# Patient Record
Sex: MALE | Race: OTHER | HISPANIC OR LATINO | URBAN - METROPOLITAN AREA
[De-identification: names, ages, dates, MRNs, and addresses within clinical notes are randomized per-mention and may not be internally consistent; named-entity substitution may affect disease eponyms.]

---

## 2023-12-27 ENCOUNTER — EMERGENCY (EMERGENCY)
Facility: HOSPITAL | Age: 53
LOS: 1 days | Discharge: HOME | End: 2023-12-27
Attending: EMERGENCY MEDICINE
Payer: MEDICAID

## 2023-12-27 VITALS
TEMPERATURE: 98 F | DIASTOLIC BLOOD PRESSURE: 79 MMHG | RESPIRATION RATE: 16 BRPM | HEART RATE: 68 BPM | OXYGEN SATURATION: 97 % | SYSTOLIC BLOOD PRESSURE: 144 MMHG

## 2023-12-27 VITALS
SYSTOLIC BLOOD PRESSURE: 175 MMHG | DIASTOLIC BLOOD PRESSURE: 89 MMHG | RESPIRATION RATE: 16 BRPM | OXYGEN SATURATION: 96 % | HEIGHT: 68 IN | WEIGHT: 179.9 LBS | HEART RATE: 70 BPM | TEMPERATURE: 98 F

## 2023-12-27 LAB
ANION GAP SERPL CALC-SCNC: 5 MMOL/L — SIGNIFICANT CHANGE UP (ref 5–17)
ANION GAP SERPL CALC-SCNC: 5 MMOL/L — SIGNIFICANT CHANGE UP (ref 5–17)
BASOPHILS # BLD AUTO: 0.09 K/UL — SIGNIFICANT CHANGE UP (ref 0–0.2)
BASOPHILS # BLD AUTO: 0.09 K/UL — SIGNIFICANT CHANGE UP (ref 0–0.2)
BASOPHILS NFR BLD AUTO: 1 % — SIGNIFICANT CHANGE UP (ref 0–2)
BASOPHILS NFR BLD AUTO: 1 % — SIGNIFICANT CHANGE UP (ref 0–2)
BUN SERPL-MCNC: 17 MG/DL — SIGNIFICANT CHANGE UP (ref 7–18)
BUN SERPL-MCNC: 17 MG/DL — SIGNIFICANT CHANGE UP (ref 7–18)
CALCIUM SERPL-MCNC: 8.7 MG/DL — SIGNIFICANT CHANGE UP (ref 8.4–10.5)
CALCIUM SERPL-MCNC: 8.7 MG/DL — SIGNIFICANT CHANGE UP (ref 8.4–10.5)
CHLORIDE SERPL-SCNC: 107 MMOL/L — SIGNIFICANT CHANGE UP (ref 96–108)
CHLORIDE SERPL-SCNC: 107 MMOL/L — SIGNIFICANT CHANGE UP (ref 96–108)
CO2 SERPL-SCNC: 27 MMOL/L — SIGNIFICANT CHANGE UP (ref 22–31)
CO2 SERPL-SCNC: 27 MMOL/L — SIGNIFICANT CHANGE UP (ref 22–31)
CREAT SERPL-MCNC: 0.85 MG/DL — SIGNIFICANT CHANGE UP (ref 0.5–1.3)
CREAT SERPL-MCNC: 0.85 MG/DL — SIGNIFICANT CHANGE UP (ref 0.5–1.3)
EGFR: 105 ML/MIN/1.73M2 — SIGNIFICANT CHANGE UP
EGFR: 105 ML/MIN/1.73M2 — SIGNIFICANT CHANGE UP
EOSINOPHIL # BLD AUTO: 0.27 K/UL — SIGNIFICANT CHANGE UP (ref 0–0.5)
EOSINOPHIL # BLD AUTO: 0.27 K/UL — SIGNIFICANT CHANGE UP (ref 0–0.5)
EOSINOPHIL NFR BLD AUTO: 2.9 % — SIGNIFICANT CHANGE UP (ref 0–6)
EOSINOPHIL NFR BLD AUTO: 2.9 % — SIGNIFICANT CHANGE UP (ref 0–6)
GLUCOSE SERPL-MCNC: 99 MG/DL — SIGNIFICANT CHANGE UP (ref 70–99)
GLUCOSE SERPL-MCNC: 99 MG/DL — SIGNIFICANT CHANGE UP (ref 70–99)
HCT VFR BLD CALC: 41 % — SIGNIFICANT CHANGE UP (ref 39–50)
HCT VFR BLD CALC: 41 % — SIGNIFICANT CHANGE UP (ref 39–50)
HGB BLD-MCNC: 14.1 G/DL — SIGNIFICANT CHANGE UP (ref 13–17)
HGB BLD-MCNC: 14.1 G/DL — SIGNIFICANT CHANGE UP (ref 13–17)
IMM GRANULOCYTES NFR BLD AUTO: 1.7 % — HIGH (ref 0–0.9)
IMM GRANULOCYTES NFR BLD AUTO: 1.7 % — HIGH (ref 0–0.9)
LYMPHOCYTES # BLD AUTO: 3.69 K/UL — HIGH (ref 1–3.3)
LYMPHOCYTES # BLD AUTO: 3.69 K/UL — HIGH (ref 1–3.3)
LYMPHOCYTES # BLD AUTO: 39.8 % — SIGNIFICANT CHANGE UP (ref 13–44)
LYMPHOCYTES # BLD AUTO: 39.8 % — SIGNIFICANT CHANGE UP (ref 13–44)
MCHC RBC-ENTMCNC: 30.2 PG — SIGNIFICANT CHANGE UP (ref 27–34)
MCHC RBC-ENTMCNC: 30.2 PG — SIGNIFICANT CHANGE UP (ref 27–34)
MCHC RBC-ENTMCNC: 34.4 GM/DL — SIGNIFICANT CHANGE UP (ref 32–36)
MCHC RBC-ENTMCNC: 34.4 GM/DL — SIGNIFICANT CHANGE UP (ref 32–36)
MCV RBC AUTO: 87.8 FL — SIGNIFICANT CHANGE UP (ref 80–100)
MCV RBC AUTO: 87.8 FL — SIGNIFICANT CHANGE UP (ref 80–100)
MONOCYTES # BLD AUTO: 0.58 K/UL — SIGNIFICANT CHANGE UP (ref 0–0.9)
MONOCYTES # BLD AUTO: 0.58 K/UL — SIGNIFICANT CHANGE UP (ref 0–0.9)
MONOCYTES NFR BLD AUTO: 6.3 % — SIGNIFICANT CHANGE UP (ref 2–14)
MONOCYTES NFR BLD AUTO: 6.3 % — SIGNIFICANT CHANGE UP (ref 2–14)
NEUTROPHILS # BLD AUTO: 4.47 K/UL — SIGNIFICANT CHANGE UP (ref 1.8–7.4)
NEUTROPHILS # BLD AUTO: 4.47 K/UL — SIGNIFICANT CHANGE UP (ref 1.8–7.4)
NEUTROPHILS NFR BLD AUTO: 48.3 % — SIGNIFICANT CHANGE UP (ref 43–77)
NEUTROPHILS NFR BLD AUTO: 48.3 % — SIGNIFICANT CHANGE UP (ref 43–77)
NRBC # BLD: 0 /100 WBCS — SIGNIFICANT CHANGE UP (ref 0–0)
NRBC # BLD: 0 /100 WBCS — SIGNIFICANT CHANGE UP (ref 0–0)
PLATELET # BLD AUTO: 191 K/UL — SIGNIFICANT CHANGE UP (ref 150–400)
PLATELET # BLD AUTO: 191 K/UL — SIGNIFICANT CHANGE UP (ref 150–400)
POTASSIUM SERPL-MCNC: 3.2 MMOL/L — LOW (ref 3.5–5.3)
POTASSIUM SERPL-MCNC: 3.2 MMOL/L — LOW (ref 3.5–5.3)
POTASSIUM SERPL-SCNC: 3.2 MMOL/L — LOW (ref 3.5–5.3)
POTASSIUM SERPL-SCNC: 3.2 MMOL/L — LOW (ref 3.5–5.3)
RBC # BLD: 4.67 M/UL — SIGNIFICANT CHANGE UP (ref 4.2–5.8)
RBC # BLD: 4.67 M/UL — SIGNIFICANT CHANGE UP (ref 4.2–5.8)
RBC # FLD: 14.3 % — SIGNIFICANT CHANGE UP (ref 10.3–14.5)
RBC # FLD: 14.3 % — SIGNIFICANT CHANGE UP (ref 10.3–14.5)
SODIUM SERPL-SCNC: 139 MMOL/L — SIGNIFICANT CHANGE UP (ref 135–145)
SODIUM SERPL-SCNC: 139 MMOL/L — SIGNIFICANT CHANGE UP (ref 135–145)
WBC # BLD: 9.26 K/UL — SIGNIFICANT CHANGE UP (ref 3.8–10.5)
WBC # BLD: 9.26 K/UL — SIGNIFICANT CHANGE UP (ref 3.8–10.5)
WBC # FLD AUTO: 9.26 K/UL — SIGNIFICANT CHANGE UP (ref 3.8–10.5)
WBC # FLD AUTO: 9.26 K/UL — SIGNIFICANT CHANGE UP (ref 3.8–10.5)

## 2023-12-27 PROCEDURE — 99284 EMERGENCY DEPT VISIT MOD MDM: CPT

## 2023-12-27 PROCEDURE — 80048 BASIC METABOLIC PNL TOTAL CA: CPT

## 2023-12-27 PROCEDURE — 85025 COMPLETE CBC W/AUTO DIFF WBC: CPT

## 2023-12-27 PROCEDURE — 73630 X-RAY EXAM OF FOOT: CPT | Mod: 26,LT

## 2023-12-27 PROCEDURE — 73630 X-RAY EXAM OF FOOT: CPT

## 2023-12-27 PROCEDURE — 99283 EMERGENCY DEPT VISIT LOW MDM: CPT | Mod: 25

## 2023-12-27 PROCEDURE — 36415 COLL VENOUS BLD VENIPUNCTURE: CPT

## 2023-12-27 RX ORDER — ACETAMINOPHEN 500 MG
650 TABLET ORAL ONCE
Refills: 0 | Status: COMPLETED | OUTPATIENT
Start: 2023-12-27 | End: 2023-12-27

## 2023-12-27 RX ADMIN — Medication 500 MILLIGRAM(S): at 18:48

## 2023-12-27 RX ADMIN — Medication 650 MILLIGRAM(S): at 18:21

## 2023-12-27 RX ADMIN — Medication 650 MILLIGRAM(S): at 17:51

## 2023-12-27 RX ADMIN — Medication 500 MILLIGRAM(S): at 18:18

## 2023-12-27 NOTE — ED PROVIDER NOTE - NSFOLLOWUPCLINICSTOKEN_GEN_ALL_ED_FT
[id number]: || ||00\01||False; [id number]: || ||00\01||False; [id number]: || ||00\01||False; [id number]: || ||00\01||False;

## 2023-12-27 NOTE — ED PROVIDER NOTE - OBJECTIVE STATEMENT
53-year-old male, history of hypertension, HLD, gout, left foot surgery (for gout) lower year ago, presents for evaluation of intermittent left foot pain.  Pain usually starts if walking a lot or exerting himself.  Pain does not happen every day.  Denies any similarity to last gout attack.  At times also feels transient tingling sensation to the calf which last few seconds and goes away.  Was recently evaluated and had an x-ray 6 months ago.  Did not follow-up with specialist.  Was told to take medication over-the-counter.  Denies any recent skin breakdown or trauma.  Denies any ready numbness, swelling, streaking or any other complaint. Patient also reports that recently even though he drinks a lot of water he urinates much less.

## 2023-12-27 NOTE — ED PROVIDER NOTE - NS_EDPROVIDERDISPOUSERTYPE_ED_A_ED
peep to 10 per dr myrick order

-------------------------------------------------------------------------------

Addendum: 04/22/20 at 0509 by DORY RUSSELL RT

-------------------------------------------------------------------------------

Amended: Links added. Attending Attestation (For Attendings USE Only)...

## 2023-12-27 NOTE — ED PROVIDER NOTE - WR INTERPRETATION 1
Independent interpretation of x-ray images by Jaziel George NP: Degenerative changes of the ankle.  No fracture or dislocation.

## 2023-12-27 NOTE — ED PROVIDER NOTE - NSFOLLOWUPCLINICS_GEN_ALL_ED_FT
[facility]  Podiatry/Wound Care  [address] [place] [address]  Phone: [phone]  Fax: [phone]     [facility]  Podiatry/Wound Care  [address] [place] [address]  Phone: [phone]  Fax: [phone]     [facility]  Podiatry/Wound Care  [address] [place] [address]  Phone: [phone]  Fax: [phone]     [facility]  Podiatry/Wound Care  [address] [place] [address]  Phone: [phone]  Fax: [phone]

## 2023-12-27 NOTE — ED ADULT NURSE NOTE - OBJECTIVE STATEMENT
As per pt. c/o left  ankle pain x1 year s/p ankle surgery. Pt. also c/o of severe pain especially when  he walks

## 2023-12-27 NOTE — ED PROVIDER NOTE - PATIENT PORTAL LINK FT
You can access the FollowMyHealth Patient Portal offered by [facility] by registering at the following website: [email or web address]. By joining [employer]’s FollowMyHealth portal, you will also be able to view your health information using other applications (apps) compatible with our system. You can access the FollowMyHealth Patient Portal offered by [facility] by registering at the following website: [email or web address]. By joining [employer]’s FollowMyHealth portal, you will also be able to view your health information using other applications (apps) compatible with our system. You can access the FollowMyHealth Patient Portal offered by [facility] by registering at the following website: [email or web address]. By joining [employer]’s FollowMyHealth portal, you will also be able to view your health information using other applications (apps) compatible with our system. You can access the FollowMyHealth Patient Portal offered by [facility] by registering at the following website: [email or web address]. By joining [employer]’s FollowMyHealth portal, you will also be able to view your health information using other applications (apps) compatible with our system.

## 2023-12-27 NOTE — ED PROVIDER NOTE - CLINICAL SUMMARY MEDICAL DECISION MAKING FREE TEXT BOX
53-year-old male presents with intermittent left foot pain x 1 year.  Also reports decreased urination while drinking plenty of fluids.  On exam well-appearing, ambulatory, vital signs stable, afebrile.  No signs of fluid overload.  No signs of cellulitis or abscess.  Low suspicion for acute gouty arthritis.  Low suspicion for septic arthritis.  Will do x-ray to rule out acute bony pathology and do labs to assess for creatinine/BUN/GFR.  If no acute concerning results today patient will need to follow-up outpatient with podiatry. negative Patient-parent interaction appropriate 53-year-old male presents with intermittent left foot pain x 1 year.  Also reports decreased urination while drinking plenty of fluids.  On exam well-appearing, ambulatory, vital signs stable, afebrile.  No signs of fluid overload.  No signs of cellulitis or abscess.  Low suspicion for acute gouty arthritis.  Low suspicion for septic arthritis.  Will do x-ray to rule out acute bony pathology and do labs to assess for creatinine/BUN/GFR.  If no acute concerning results today patient will need to follow-up outpatient with podiatry.    XR negative. Creat/BUN/gfr wnl. Will dc with podiatry clinic follow up within 1 week.

## 2023-12-27 NOTE — ED ADULT NURSE NOTE - CADM POA CENTRAL LINE
Problem: Falls - Risk of:  Goal: Will remain free from falls  Description: Will remain free from falls  1/14/2021 2329 by Harshal Alcantara RN  Outcome: Ongoing  1/14/2021 1119 by Audrey Garcia RN  Outcome: Ongoing  Goal: Absence of physical injury  Description: Absence of physical injury  1/14/2021 2329 by Harshal Alcantara RN  Outcome: Ongoing  1/14/2021 1119 by Audrey Garcia RN  Outcome: Ongoing No

## 2023-12-27 NOTE — ED PROVIDER NOTE - NSFOLLOWUPINSTRUCTIONS_ED_ALL_ED_FT
Follow up with the podiatry clinic within 1 week. Call to make appointment.  If you experience any new or worsening symptoms or if you are concerned you can always come back to the emergency for a re-evaluation.  Some results may not be available at the time of your discharge from the hospital. You can download the FOLLOW MY HEALTH jose and have access to these results.  **Official radiology report by the radiologist will be available within 24 hours. If there is a discrepancy you will contacted.   If there were any prescriptions given to you during the visit today take them as prescribed. If you have any questions you can ask the pharmacist.

## 2023-12-27 NOTE — ED ADULT NURSE NOTE - NSFALLUNIVINTERV_ED_ALL_ED
Bed/Stretcher in lowest position, wheels locked, appropriate side rails in place/Call bell, personal items and telephone in reach/Instruct patient to call for assistance before getting out of bed/chair/stretcher/Non-slip footwear applied when patient is off stretcher/[place] to call system/Physically safe environment - no spills, clutter or unnecessary equipment/Purposeful proactive rounding/Room/bathroom lighting operational, light cord in reach Bed/Stretcher in lowest position, wheels locked, appropriate side rails in place/Call bell, personal items and telephone in reach/Instruct patient to call for assistance before getting out of bed/chair/stretcher/Non-slip footwear applied when patient is off stretcher/[place] to call system/Physically safe environment - no spills, clutter or unnecessary equipment/Purposeful proactive rounding/Room/bathroom lighting operational, light cord in reach

## 2023-12-27 NOTE — ED PROVIDER NOTE - PHYSICAL EXAMINATION
Bilateral calf compartments soft and nontender.  Left ankle full range of motion.  Scar and mild swelling near the region of the ATFL from previous surgery.  Bony prominence to the lateral foot area near heel.  No erythema, induration or streaking.  Cap refill less than 2 seconds.  DP/PT pulses intact 2+.  No skin rash or breakdown.

## 2023-12-28 ENCOUNTER — APPOINTMENT (OUTPATIENT)
Dept: PODIATRY | Facility: CLINIC | Age: 53
End: 2023-12-28

## 2023-12-28 ENCOUNTER — OUTPATIENT (OUTPATIENT)
Dept: OUTPATIENT SERVICES | Facility: HOSPITAL | Age: 53
LOS: 1 days | End: 2023-12-28
Payer: SELF-PAY

## 2023-12-28 VITALS
DIASTOLIC BLOOD PRESSURE: 80 MMHG | TEMPERATURE: 98.5 F | WEIGHT: 165 LBS | HEART RATE: 77 BPM | SYSTOLIC BLOOD PRESSURE: 155 MMHG | HEIGHT: 67 IN | RESPIRATION RATE: 18 BRPM | BODY MASS INDEX: 25.9 KG/M2 | OXYGEN SATURATION: 96 %

## 2023-12-28 DIAGNOSIS — I10 ESSENTIAL (PRIMARY) HYPERTENSION: ICD-10-CM

## 2023-12-28 DIAGNOSIS — Z00.00 ENCOUNTER FOR GENERAL ADULT MEDICAL EXAMINATION WITHOUT ABNORMAL FINDINGS: ICD-10-CM

## 2023-12-28 DIAGNOSIS — E78.5 HYPERLIPIDEMIA, UNSPECIFIED: ICD-10-CM

## 2023-12-28 DIAGNOSIS — M79.672 PAIN IN LEFT FOOT: ICD-10-CM

## 2023-12-28 PROCEDURE — G0463: CPT

## 2023-12-29 DIAGNOSIS — M10.9 GOUT, UNSPECIFIED: ICD-10-CM

## 2023-12-29 PROBLEM — I10 HYPERTENSION, UNSPECIFIED TYPE: Status: ACTIVE | Noted: 2023-12-29

## 2023-12-29 PROBLEM — M79.672 LEFT FOOT PAIN: Status: ACTIVE | Noted: 2023-12-29

## 2023-12-29 PROBLEM — E78.5 HYPERLIPIDEMIA: Status: ACTIVE | Noted: 2023-12-29

## 2023-12-30 PROBLEM — M10.9 GOUT, UNSPECIFIED: Chronic | Status: ACTIVE | Noted: 2023-12-27

## 2023-12-30 PROBLEM — E78.5 HYPERLIPIDEMIA, UNSPECIFIED: Chronic | Status: ACTIVE | Noted: 2023-12-27

## 2023-12-30 PROBLEM — I10 ESSENTIAL (PRIMARY) HYPERTENSION: Chronic | Status: ACTIVE | Noted: 2023-12-27

## 2024-01-04 NOTE — PROCEDURE
[FreeTextEntry1] : Physcial exam:  Vasc: DP and PT pulses palpeable 2/4. CFt<3 seconds. TG warm to warm with no increase in temp to left ankle joint.  Derm: No open lesions. Edema noted to left medial mall. No erythema or ecchymosis appreciated.  Neuro: Gross sensation intact Msk: POP to left medial mall. Pain in inversion and eversion, but still 5/5 grade muscle strength  A:  Gout  P:  Patient and patients chart were reveiwed Diagnosis was discussed with patient Xrays reveiwed Educated patient to take naproxen Patient to be weight bearing as tolerated, but rest and elevate when able Patient to get possible MRI after finishing course of naproxen  Uric acid blood work ordered RTC in 2 weeks

## 2024-01-04 NOTE — HISTORY OF PRESENT ILLNESS
[FreeTextEntry1] : Patient stated that he went to the  ED where they took xrays of his foot and was given naproxen. Patient stated that he had surgery on his left foot for a cleaning of the joint. He was taking colchicine prior to his surgery but is no longer on it. Patient stated that his is not having pain at his ankle joint. Patient has not been doing anything at home for this issue. Denies other pedal complaints.

## 2024-01-11 ENCOUNTER — OUTPATIENT (OUTPATIENT)
Dept: OUTPATIENT SERVICES | Facility: HOSPITAL | Age: 54
LOS: 1 days | End: 2024-01-11
Payer: SELF-PAY

## 2024-01-11 ENCOUNTER — APPOINTMENT (OUTPATIENT)
Dept: RADIOLOGY | Facility: CLINIC | Age: 54
End: 2024-01-11
Payer: COMMERCIAL

## 2024-01-11 ENCOUNTER — APPOINTMENT (OUTPATIENT)
Dept: PODIATRY | Facility: CLINIC | Age: 54
End: 2024-01-11

## 2024-01-11 VITALS
SYSTOLIC BLOOD PRESSURE: 172 MMHG | DIASTOLIC BLOOD PRESSURE: 108 MMHG | HEIGHT: 67 IN | OXYGEN SATURATION: 96 % | BODY MASS INDEX: 25.9 KG/M2 | TEMPERATURE: 97 F | RESPIRATION RATE: 18 BRPM | HEART RATE: 73 BPM | WEIGHT: 165 LBS

## 2024-01-11 DIAGNOSIS — M10.9 GOUT, UNSPECIFIED: ICD-10-CM

## 2024-01-11 DIAGNOSIS — Z00.00 ENCOUNTER FOR GENERAL ADULT MEDICAL EXAMINATION WITHOUT ABNORMAL FINDINGS: ICD-10-CM

## 2024-01-11 DIAGNOSIS — M25.572 PAIN IN LEFT ANKLE AND JOINTS OF LEFT FOOT: ICD-10-CM

## 2024-01-11 PROCEDURE — 73610 X-RAY EXAM OF ANKLE: CPT | Mod: LT

## 2024-01-11 PROCEDURE — G0463: CPT

## 2024-01-18 NOTE — HISTORY OF PRESENT ILLNESS
[FreeTextEntry1] : 52 yo male returns to clinic for left ankle pain. Pt took naprosyn but still admits to pain. Per note, pt states that he had surgery done on his left ankle back in 2022 by ortho to clean up the joint for sepsis/gout. Since then, pt had chronic pain to his ankle. Previous foot xray was unremarkable. Pt states that the pain gets worse upon ambulation. Denies any other complaints. Denies any constitutional symptoms.

## 2024-01-18 NOTE — PROCEDURE
[FreeTextEntry1] : Physcial exam:  Vasc: DP and PT pulses palpeable 2/4. CFt<3 seconds. TG warm to warm with no increase in temp to left ankle joint.  Derm: No open lesions. Edema noted to left medial mall. No erythema or ecchymosis appreciated.  Neuro: Gross sensation intact Msk: POP around the ankle joint. Pain in inversion and eversion, but still 5/5 grade muscle strength  A:  Gout Left ankle pain   P:  Patient seen and evaluated Foot xray reviewed, ordered left ankle xray Uric acid noted to be elevated Rx colchicine 0.6mg x 7 days Educated patient to avoid food that can trigger gout attacks such as red meat, wine, seafood Patient to be weight bearing as tolerated, but rest and elevate as needed Will consider MRI after xray of the ankle if symptoms persist RTC in 2 weeks

## 2024-01-25 ENCOUNTER — APPOINTMENT (OUTPATIENT)
Dept: PODIATRY | Facility: CLINIC | Age: 54
End: 2024-01-25

## 2024-01-25 ENCOUNTER — OUTPATIENT (OUTPATIENT)
Dept: OUTPATIENT SERVICES | Facility: HOSPITAL | Age: 54
LOS: 1 days | End: 2024-01-25
Payer: SELF-PAY

## 2024-01-25 VITALS
OXYGEN SATURATION: 98 % | RESPIRATION RATE: 18 BRPM | DIASTOLIC BLOOD PRESSURE: 76 MMHG | HEART RATE: 96 BPM | TEMPERATURE: 98.2 F | WEIGHT: 167 LBS | SYSTOLIC BLOOD PRESSURE: 119 MMHG | BODY MASS INDEX: 26.21 KG/M2 | HEIGHT: 67 IN

## 2024-01-25 DIAGNOSIS — Z00.00 ENCOUNTER FOR GENERAL ADULT MEDICAL EXAMINATION WITHOUT ABNORMAL FINDINGS: ICD-10-CM

## 2024-01-25 PROCEDURE — G0463: CPT

## 2024-01-25 PROCEDURE — 20605 DRAIN/INJ JOINT/BURSA W/O US: CPT

## 2024-01-26 DIAGNOSIS — M10.9 GOUT, UNSPECIFIED: ICD-10-CM

## 2024-01-26 DIAGNOSIS — M25.572 PAIN IN LEFT ANKLE AND JOINTS OF LEFT FOOT: ICD-10-CM

## 2024-02-08 ENCOUNTER — OUTPATIENT (OUTPATIENT)
Dept: OUTPATIENT SERVICES | Facility: HOSPITAL | Age: 54
LOS: 1 days | End: 2024-02-08
Payer: SELF-PAY

## 2024-02-08 ENCOUNTER — APPOINTMENT (OUTPATIENT)
Dept: PODIATRY | Facility: CLINIC | Age: 54
End: 2024-02-08

## 2024-02-08 VITALS
DIASTOLIC BLOOD PRESSURE: 93 MMHG | OXYGEN SATURATION: 96 % | HEIGHT: 67 IN | RESPIRATION RATE: 18 BRPM | BODY MASS INDEX: 26.21 KG/M2 | SYSTOLIC BLOOD PRESSURE: 158 MMHG | HEART RATE: 71 BPM | TEMPERATURE: 97.7 F | WEIGHT: 167 LBS

## 2024-02-08 DIAGNOSIS — M10.072 IDIOPATHIC GOUT, LEFT ANKLE AND FOOT: ICD-10-CM

## 2024-02-08 DIAGNOSIS — M25.572 PAIN IN LEFT ANKLE AND JOINTS OF LEFT FOOT: ICD-10-CM

## 2024-02-08 DIAGNOSIS — M19.072 PRIMARY OSTEOARTHRITIS, LEFT ANKLE AND FOOT: ICD-10-CM

## 2024-02-08 DIAGNOSIS — Z00.00 ENCOUNTER FOR GENERAL ADULT MEDICAL EXAMINATION WITHOUT ABNORMAL FINDINGS: ICD-10-CM

## 2024-02-08 PROCEDURE — G0463: CPT

## 2024-02-08 NOTE — HISTORY OF PRESENT ILLNESS
[FreeTextEntry1] : 52 yo male returns to clinic for left ankle pain. Pt took colcichine for 2 weeks as uric acid levels noted.   Per note, pt states that he had surgery done on his left ankle back in 2022 by ortho to clean up the joint for sepsis/gout. Since then, pt had chronic pain to his ankle. Previous foot xray was unremarkable. 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 Pt states that the pain gets worse upon ambulation. Denies any other complaints. Denies any constitutional symptoms.

## 2024-02-08 NOTE — PROCEDURE
[FreeTextEntry1] : Physcial exam:  Vasc: DP and PT pulses palpeable 2/4. CFt<3 seconds. TG warm to warm with no increase in temp to left ankle joint.  Derm: No open lesions. Edema noted to left medial mall. No erythema or ecchymosis appreciated.  Neuro: Gross sensation intact Msk: POP around the ankle joint at the medial tibio talar articulation. Pain in inversion and eversion, but still 5/5 grade muscle strength  A:  Gout Left ankle pain   P:  Patient seen and evaluated Foot xray reviewed,  Reviewed left ankle xray Uric acid noted to be elevated Finished colchicine 0.6mg x 7 days ankle joint injection last week, provided minimal release.  MRI left ankle ordered Recommend using ankle brace for ankle stability.  Recommend rheumatology  Educated patient to avoid food that can trigger gout attacks such as red meat, wine, seafood Patient to be weight bearing as tolerated, but rest and elevate as needed RTC after MRI L ankle and rheum consult

## 2024-02-08 NOTE — HISTORY OF PRESENT ILLNESS
[FreeTextEntry1] : 54 yo male returns to clinic for left ankle pain. Pt took colcichine for 2 weeks as uric acid levels noted.   Per note, pt states that he had surgery done on his left ankle back in 2022 by ortho to clean up the joint for sepsis/gout. Since then, pt had chronic pain to his ankle. Previous foot xray was unremarkable. 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 Pt states that the pain gets worse upon ambulation. Denies any other complaints. Denies any constitutional symptoms.   Pt presents today states his pain is still there and the injection  worked for several days. Patient states he still has pain on his ankle when he does long distance walks and nothing works. Patient states that NSAIDs also do not work. Patient denies any other pedal complaints at this time.  OF note: Patient brought paperwork that he had a I&D washout in 2022 with ortho. Cultures show it was crystalized gout.l

## 2024-02-08 NOTE — PROCEDURE
[FreeTextEntry1] : Physcial exam:  Vasc: DP and PT pulses palpeable 2/4. CFt<3 seconds. TG warm to warm with no increase in temp to left ankle joint.  Derm: No open lesions. Edema noted to left medial mall. No erythema or ecchymosis appreciated.  Neuro: Gross sensation intact Msk: POP around the ankle joint at the medial tibio talar articulation. Pain in inversion and eversion, but still 5/5 grade muscle strength  A:  Gout Left ankle pain   P:  Patient seen and evaluated Foot xray reviewed,  Reviewed left ankle xray Uric acid noted to be elevated Finished colchicine 0.6mg x 7 days obtained written consent and injected 2 cc mix of dexamethason and lidocaine to left medial ankle joint without incidence Educated patient to avoid food that can trigger gout attacks such as red meat, wine, seafood Patient to be weight bearing as tolerated, but rest and elevate as needed RTC in 2 weeks

## 2024-02-23 ENCOUNTER — APPOINTMENT (OUTPATIENT)
Dept: MRI IMAGING | Facility: CLINIC | Age: 54
End: 2024-02-23
Payer: COMMERCIAL

## 2024-02-23 PROCEDURE — 73721 MRI JNT OF LWR EXTRE W/O DYE: CPT | Mod: LT

## 2024-04-12 ENCOUNTER — OUTPATIENT (OUTPATIENT)
Dept: OUTPATIENT SERVICES | Facility: HOSPITAL | Age: 54
LOS: 1 days | End: 2024-04-12
Payer: SELF-PAY

## 2024-04-12 ENCOUNTER — APPOINTMENT (OUTPATIENT)
Dept: RHEUMATOLOGY | Facility: HOSPITAL | Age: 54
End: 2024-04-12
Payer: COMMERCIAL

## 2024-04-12 VITALS
OXYGEN SATURATION: 97 % | DIASTOLIC BLOOD PRESSURE: 117 MMHG | WEIGHT: 190 LBS | SYSTOLIC BLOOD PRESSURE: 197 MMHG | BODY MASS INDEX: 29.82 KG/M2 | RESPIRATION RATE: 16 BRPM | TEMPERATURE: 97.6 F | HEIGHT: 67 IN | HEART RATE: 70 BPM

## 2024-04-12 VITALS — SYSTOLIC BLOOD PRESSURE: 168 MMHG | DIASTOLIC BLOOD PRESSURE: 110 MMHG

## 2024-04-12 DIAGNOSIS — M25.572 PAIN IN LEFT ANKLE AND JOINTS OF LEFT FOOT: ICD-10-CM

## 2024-04-12 DIAGNOSIS — M06.9 RHEUMATOID ARTHRITIS, UNSPECIFIED: ICD-10-CM

## 2024-04-12 DIAGNOSIS — M10.9 GOUT, UNSPECIFIED: ICD-10-CM

## 2024-04-12 LAB
A1C WITH ESTIMATED AVERAGE GLUCOSE RESULT: 5.6 % — SIGNIFICANT CHANGE UP (ref 4–5.6)
ALBUMIN SERPL ELPH-MCNC: 4.7 G/DL — SIGNIFICANT CHANGE UP (ref 3.3–5)
ALP SERPL-CCNC: 122 U/L — HIGH (ref 40–120)
ALT FLD-CCNC: 15 U/L — SIGNIFICANT CHANGE UP (ref 10–45)
ANION GAP SERPL CALC-SCNC: 15 MMOL/L — SIGNIFICANT CHANGE UP (ref 5–17)
AST SERPL-CCNC: 13 U/L — SIGNIFICANT CHANGE UP (ref 10–40)
BASOPHILS # BLD AUTO: 0.06 K/UL — SIGNIFICANT CHANGE UP (ref 0–0.2)
BASOPHILS NFR BLD AUTO: 0.7 % — SIGNIFICANT CHANGE UP (ref 0–2)
BILIRUB SERPL-MCNC: 0.6 MG/DL — SIGNIFICANT CHANGE UP (ref 0.2–1.2)
BUN SERPL-MCNC: 17 MG/DL — SIGNIFICANT CHANGE UP (ref 7–23)
CALCIUM SERPL-MCNC: 9.6 MG/DL — SIGNIFICANT CHANGE UP (ref 8.4–10.5)
CHLORIDE SERPL-SCNC: 100 MMOL/L — SIGNIFICANT CHANGE UP (ref 96–108)
CHOLEST SERPL-MCNC: 227 MG/DL — HIGH
CO2 SERPL-SCNC: 24 MMOL/L — SIGNIFICANT CHANGE UP (ref 22–31)
CREAT SERPL-MCNC: 0.88 MG/DL — SIGNIFICANT CHANGE UP (ref 0.5–1.3)
EGFR: 103 ML/MIN/1.73M2 — SIGNIFICANT CHANGE UP
EOSINOPHIL # BLD AUTO: 0.3 K/UL — SIGNIFICANT CHANGE UP (ref 0–0.5)
EOSINOPHIL NFR BLD AUTO: 3.5 % — SIGNIFICANT CHANGE UP (ref 0–6)
ESTIMATED AVERAGE GLUCOSE: 114 MG/DL — SIGNIFICANT CHANGE UP (ref 68–114)
GLUCOSE SERPL-MCNC: 84 MG/DL — SIGNIFICANT CHANGE UP (ref 70–99)
HCT VFR BLD CALC: 43.9 % — SIGNIFICANT CHANGE UP (ref 39–50)
HDLC SERPL-MCNC: 31 MG/DL — LOW
HGB BLD-MCNC: 14.9 G/DL — SIGNIFICANT CHANGE UP (ref 13–17)
IMM GRANULOCYTES NFR BLD AUTO: 0.5 % — SIGNIFICANT CHANGE UP (ref 0–0.9)
LIPID PNL WITH DIRECT LDL SERPL: 89 MG/DL — SIGNIFICANT CHANGE UP
LYMPHOCYTES # BLD AUTO: 2.58 K/UL — SIGNIFICANT CHANGE UP (ref 1–3.3)
LYMPHOCYTES # BLD AUTO: 30 % — SIGNIFICANT CHANGE UP (ref 13–44)
MCHC RBC-ENTMCNC: 29.9 PG — SIGNIFICANT CHANGE UP (ref 27–34)
MCHC RBC-ENTMCNC: 33.9 GM/DL — SIGNIFICANT CHANGE UP (ref 32–36)
MCV RBC AUTO: 88 FL — SIGNIFICANT CHANGE UP (ref 80–100)
MONOCYTES # BLD AUTO: 0.57 K/UL — SIGNIFICANT CHANGE UP (ref 0–0.9)
MONOCYTES NFR BLD AUTO: 6.6 % — SIGNIFICANT CHANGE UP (ref 2–14)
NEUTROPHILS # BLD AUTO: 5.05 K/UL — SIGNIFICANT CHANGE UP (ref 1.8–7.4)
NEUTROPHILS NFR BLD AUTO: 58.7 % — SIGNIFICANT CHANGE UP (ref 43–77)
NON HDL CHOLESTEROL: 196 MG/DL — HIGH
PLATELET # BLD AUTO: 235 K/UL — SIGNIFICANT CHANGE UP (ref 150–400)
POTASSIUM SERPL-MCNC: 3.8 MMOL/L — SIGNIFICANT CHANGE UP (ref 3.5–5.3)
POTASSIUM SERPL-SCNC: 3.8 MMOL/L — SIGNIFICANT CHANGE UP (ref 3.5–5.3)
PROT SERPL-MCNC: 7.7 G/DL — SIGNIFICANT CHANGE UP (ref 6–8.3)
RBC # BLD: 4.99 M/UL — SIGNIFICANT CHANGE UP (ref 4.2–5.8)
RBC # FLD: 13.6 % — SIGNIFICANT CHANGE UP (ref 10.3–14.5)
SODIUM SERPL-SCNC: 139 MMOL/L — SIGNIFICANT CHANGE UP (ref 135–145)
TRIGL SERPL-MCNC: 650 MG/DL — HIGH
URATE SERPL-MCNC: 7.7 MG/DL — SIGNIFICANT CHANGE UP (ref 3.4–8.8)
WBC # BLD: 8.6 K/UL — SIGNIFICANT CHANGE UP (ref 3.8–10.5)
WBC # FLD AUTO: 8.6 K/UL — SIGNIFICANT CHANGE UP (ref 3.8–10.5)

## 2024-04-12 PROCEDURE — 80061 LIPID PANEL: CPT

## 2024-04-12 PROCEDURE — 80053 COMPREHEN METABOLIC PANEL: CPT

## 2024-04-12 PROCEDURE — 84550 ASSAY OF BLOOD/URIC ACID: CPT

## 2024-04-12 PROCEDURE — 85025 COMPLETE CBC W/AUTO DIFF WBC: CPT

## 2024-04-12 PROCEDURE — 83036 HEMOGLOBIN GLYCOSYLATED A1C: CPT

## 2024-04-12 PROCEDURE — ZZZZZ: CPT | Mod: GC

## 2024-04-12 PROCEDURE — G0463: CPT

## 2024-04-12 RX ORDER — NIFEDIPINE 90 MG/1
90 TABLET, EXTENDED RELEASE ORAL DAILY
Refills: 0 | Status: ACTIVE | COMMUNITY
Start: 2024-04-12

## 2024-04-12 RX ORDER — ACETAMINOPHEN 325 MG/1
TABLET, FILM COATED ORAL
Refills: 0 | Status: DISCONTINUED | COMMUNITY
End: 2024-04-12

## 2024-04-12 RX ORDER — COLCHICINE 0.6 MG/1
0.6 TABLET ORAL TWICE DAILY
Qty: 14 | Refills: 0 | Status: DISCONTINUED | COMMUNITY
Start: 2024-01-11 | End: 2024-04-12

## 2024-04-12 RX ORDER — IRBESARTAN 150 MG/1
150 TABLET ORAL
Qty: 120 | Refills: 0 | Status: ACTIVE | COMMUNITY
Start: 2024-04-12 | End: 1900-01-01

## 2024-04-12 NOTE — PHYSICAL EXAM
[Abnormal Walk] : normal gait [Nail Clubbing] : no clubbing  or cyanosis of the fingernails [General Appearance - Alert] : alert [General Appearance - In No Acute Distress] : in no acute distress [Sclera] : the sclera and conjunctiva were normal [Outer Ear] : the ears and nose were normal in appearance [Oropharynx] : the oropharynx was normal [Neck Appearance] : the appearance of the neck was normal [Respiration, Rhythm And Depth] : normal respiratory rhythm and effort [Auscultation Breath Sounds / Voice Sounds] : lungs were clear to auscultation bilaterally [Heart Sounds] : normal S1 and S2 [Murmurs] : no murmurs [Edema] : there was no peripheral edema [No CVA Tenderness] : no ~M costovertebral angle tenderness [No Spinal Tenderness] : no spinal tenderness [Skin Turgor] : normal skin turgor [] : no rash [No Focal Deficits] : no focal deficits [Oriented To Time, Place, And Person] : oriented to person, place, and time [Affect] : the affect was normal [FreeTextEntry1] : L ankle diffuse tenderness of posterior tibial tendon proximal and just distal to medial malleolus, no pain at insertion of posterial tibial tendon, positive tinel at percussion of tarsal tunnel, positive anterior drawer test, no swelling, warmth, or erythema. other joints no tenderness

## 2024-04-12 NOTE — ASSESSMENT
[FreeTextEntry1] : The patient is a 53ym with MHx HTN, HLD, and gout presenting for evaluation of L ankle pain and swelling.   #Chronic L ankle pain and swelling   - Follows with Podiatry  - Reports chronic pain and swelling worse with ambulation since Ortho washout 12/2022 - MRI 2/2024 with mild tibiotalar arthrosis, mucoid degeneration of the ATFL, PTFL, and deltoid ligaments, posterior tibialis tenosynovitis, longitudinal split tear of the peroneus brevis tendon as it courses inferior to the lateral malleolus with distal reconstitution, and moderate amount of loculated fluid tracking dorsally along the roots of the extensor retinaculum in the sinus Tarsi  - Given MRI and exam findings as above, suspect pain is due to tendon tearing and degenerative changes rather than gout  - Advised supportive measures  - Advised on proper shoe wear  - Advised continued f/u with Podiatry, referral given for [facility]   #Gout - Hx of gout crystals seen during washout 12/2022 - No flares within the last year  - Uric acid 9.2 12/2023  - No indication for ULT at this time, but will re-evaluate if pt flares  - Counseled about lifestyle modifications   #Metabolic syndrome with uncontrolled HTN, BMI 30 - BP elevated, pt asymptomatic - Pt counseled to continue monitoring his BP at home - Increase irbesartan to 300 mg daily, c/w nifedipine ER 90 mg daily - Will check CBC, CMP, A1c, and lipid profile - Referral given to switch his PCP to [address]   Discussed with Dr. Malachi MACKEY 2 months for re-evaluation after above interventions   Sowmya Marin MD Rheumatology Fellow

## 2024-04-12 NOTE — REASON FOR VISIT
[Consultation] : a consultation visit [Spouse] : spouse [Interpreters_IDNumber] : [id number] [Interpreters_FullName] : Obed [TWNoteComboBox1] : [place]

## 2024-04-12 NOTE — HISTORY OF PRESENT ILLNESS
[Currently Experiencing] : currently [Arthralgias] : arthralgias [FreeTextEntry1] : The patient is a 53ym with MHx HTN, HLD, and gout presenting for evaluation of L ankle pain and swelling.   Reports a history of gout diagnosed 5 years ago based on symptoms. Only followed with his PCP, never followed with a rheumatologist. Reports having gout flares primarily of his L ankle around the lateral malleolus about 1-2 times per year. Denies any other joint involvement. Was hospitalized 12/2022 for L ankle pain and swelling and had arthrocentesis done with WBC >50k no crystals noted. There was concern for septic joint so Ortho did a washout with crystals noted during it with negative cultures. Pt was on colchicine briefly that time. Denies ever being on ULT in the past. No alcohol, red meat, or shellfish exposure. No hx of CKD or kidney stones. Complains of chronic L ankle pain and swelling around the medial malleolus, worse after ambulation, after the washout procedure 12/2022. Denies any further gout episodes with redness and warmth since that procedure. Has been following with Podiatry for the L ankle issue, s/p steroid injection recently with minimal relief.   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, no alcohol  [Anorexia] : no anorexia [Weight Loss] : no weight loss [Malaise] : no malaise [Fever] : no fever [Chills] : no chills [Fatigue] : no fatigue [Depression] : no depression [Malar Facial Rash] : no malar facial rash [Skin Lesions] : no lesions [Skin Nodules] : no skin nodules [Oral Ulcers] : no oral ulcers [Cough] : no cough [Dry Mouth] : no dry mouth [Dysphonia] : no dysphonia [Dysphagia] : no dysphagia [Shortness of Breath] : no shortness of breath [Chest Pain] : no chest pain [Joint Swelling] : no joint swelling [Joint Warmth] : no joint warmth [Joint Deformity] : no joint deformity [Decreased ROM] : no decreased range of motion [Morning Stiffness] : no morning stiffness [Falls] : no falls [Difficulty Standing] : no difficulty standing [Difficulty Walking] : no difficulty walking [Dyspnea] : no dyspnea [Myalgias] : no myalgias [Muscle Weakness] : no muscle weakness [Muscle Spasms] : no muscle spasms [Muscle Cramping] : no muscle cramping [Visual Changes] : no visual changes [Eye Pain] : no eye pain [Eye Redness] : no eye redness [Dry Eyes] : no dry eyes

## 2024-04-12 NOTE — DATA REVIEWED
[FreeTextEntry1] : X-ray L ankle 1/2024  EXAM: [id number] - XR ANKLE COMP MIN 3 VIEWS LT  - ORDERED BY: KT DE LEON PROCEDURE DATE:  01/11/2024 INTERPRETATION:  3 views of the left ankle. Clinical indications: Left ankle pain. 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 Osseous productive change at the medial malleoli likely related to chronic trauma. No acute fracture.  MRI L ankle 2/2024  IMPRESSION: 1. Mild tibiotalar arthrosis  2. Mucoid degeneration of the ATFL, PTFL, and deltoid ligaments. 3. Posterior tibialis tenosynovitis. 4. Longitudinal split tear of the peroneus brevis tendon as it courses inferior to the lateral malleolus with distal reconstitution. 5. Moderate amount of loculated fluid tracking dorsally along the roots of the extensor retinaculum in the sinus Tarsi.

## 2024-04-17 ENCOUNTER — APPOINTMENT (OUTPATIENT)
Dept: PODIATRY | Facility: HOSPITAL | Age: 54
End: 2024-04-17

## 2024-04-19 ENCOUNTER — NON-APPOINTMENT (OUTPATIENT)
Age: 54
End: 2024-04-19

## 2024-04-19 DIAGNOSIS — M10.9 GOUT, UNSPECIFIED: ICD-10-CM

## 2024-04-19 DIAGNOSIS — M25.572 PAIN IN LEFT ANKLE AND JOINTS OF LEFT FOOT: ICD-10-CM

## 2024-06-07 ENCOUNTER — APPOINTMENT (OUTPATIENT)
Dept: RHEUMATOLOGY | Facility: HOSPITAL | Age: 54
End: 2024-06-07